# Patient Record
Sex: FEMALE | Race: WHITE | NOT HISPANIC OR LATINO | Employment: OTHER | URBAN - METROPOLITAN AREA
[De-identification: names, ages, dates, MRNs, and addresses within clinical notes are randomized per-mention and may not be internally consistent; named-entity substitution may affect disease eponyms.]

---

## 2023-08-29 ENCOUNTER — OFFICE VISIT (OUTPATIENT)
Dept: FAMILY MEDICINE | Facility: CLINIC | Age: 43
End: 2023-08-29
Payer: COMMERCIAL

## 2023-08-29 VITALS
RESPIRATION RATE: 18 BRPM | WEIGHT: 122.81 LBS | SYSTOLIC BLOOD PRESSURE: 94 MMHG | DIASTOLIC BLOOD PRESSURE: 62 MMHG | BODY MASS INDEX: 24.76 KG/M2 | HEART RATE: 58 BPM | HEIGHT: 59 IN | OXYGEN SATURATION: 96 %

## 2023-08-29 DIAGNOSIS — F41.0 PANIC ATTACK: ICD-10-CM

## 2023-08-29 DIAGNOSIS — G90.A POTS (POSTURAL ORTHOSTATIC TACHYCARDIA SYNDROME): Primary | ICD-10-CM

## 2023-08-29 DIAGNOSIS — F41.1 GAD (GENERALIZED ANXIETY DISORDER): ICD-10-CM

## 2023-08-29 PROCEDURE — 4010F ACE/ARB THERAPY RXD/TAKEN: CPT | Mod: CPTII,S$GLB,, | Performed by: FAMILY MEDICINE

## 2023-08-29 PROCEDURE — 99204 OFFICE O/P NEW MOD 45 MIN: CPT | Mod: S$GLB,,, | Performed by: FAMILY MEDICINE

## 2023-08-29 PROCEDURE — 99203 OFFICE O/P NEW LOW 30 MIN: CPT | Mod: PBBFAC,PO | Performed by: FAMILY MEDICINE

## 2023-08-29 PROCEDURE — 99204 PR OFFICE/OUTPT VISIT, NEW, LEVL IV, 45-59 MIN: ICD-10-PCS | Mod: S$GLB,,, | Performed by: FAMILY MEDICINE

## 2023-08-29 PROCEDURE — 4010F PR ACE/ARB THEARPY RXD/TAKEN: ICD-10-PCS | Mod: CPTII,S$GLB,, | Performed by: FAMILY MEDICINE

## 2023-08-29 PROCEDURE — 99999 PR PBB SHADOW E&M-NEW PATIENT-LVL III: CPT | Mod: PBBFAC,,, | Performed by: FAMILY MEDICINE

## 2023-08-29 PROCEDURE — 99999 PR PBB SHADOW E&M-NEW PATIENT-LVL III: ICD-10-PCS | Mod: PBBFAC,,, | Performed by: FAMILY MEDICINE

## 2023-08-29 RX ORDER — BLOOD SUGAR DIAGNOSTIC
STRIP MISCELLANEOUS
COMMUNITY
Start: 2023-06-26

## 2023-08-29 RX ORDER — PROPRANOLOL HYDROCHLORIDE 10 MG/1
TABLET ORAL
COMMUNITY
End: 2023-08-29

## 2023-08-29 RX ORDER — DEXTROSE 4 G
TABLET,CHEWABLE ORAL
COMMUNITY
Start: 2023-06-07

## 2023-08-29 RX ORDER — ALPRAZOLAM 0.5 MG/1
0.5 TABLET ORAL 3 TIMES DAILY PRN
Qty: 30 TABLET | Refills: 0 | Status: SHIPPED | OUTPATIENT
Start: 2023-08-29 | End: 2023-09-28

## 2023-08-29 RX ORDER — ALPRAZOLAM 0.25 MG/1
0.25 TABLET ORAL
COMMUNITY
Start: 2023-08-18

## 2023-08-29 RX ORDER — FLUOXETINE HYDROCHLORIDE 20 MG/1
20 CAPSULE ORAL DAILY
Qty: 90 CAPSULE | Refills: 3 | Status: SHIPPED | OUTPATIENT
Start: 2023-08-29 | End: 2023-11-01

## 2023-08-29 RX ORDER — METOPROLOL SUCCINATE 25 MG/1
25 TABLET, EXTENDED RELEASE ORAL DAILY
Qty: 90 TABLET | Refills: 3 | Status: SHIPPED | OUTPATIENT
Start: 2023-08-29 | End: 2023-11-01

## 2023-08-29 NOTE — PATIENT INSTRUCTIONS
Ask your pharmacist about the tetanus booster Tdap about the COVID-19 vaccine series completion there 5 total shots.    He call me back in 6 weeks to let me know how your feeling on the Prozac

## 2023-08-29 NOTE — PROGRESS NOTES
"Subjective:       Patient ID: Alison Magana is a 43 y.o. female.    Chief Complaint: Establish Care, Headache (Numbness and tingling to face/), ear buzzing, and [employer]    HPI:  This is a pleasant 40-year-old patient here with her mother Loco.  She started having episodes of lightheadedness and dizziness and presyncope.  Also fluctuations in her weight with rates of around 160 while she was sitting down.  She has been seen by Cardiology and has monitor placed.  She was placed on a beta-blocker.  She is still very concerned about what this might be.  She has also had some GI symptoms which complicate issue confuse the issue.  She more than likely does have pots syndrome.  Do recommend hydration and also appropriate beta-blocker.  Follow through with Cardiology.    Because of the anxiety that this is caused her several months ago do recommend we go to an SSRI in the fluoxetine start with 20 mg a day and increase to 40 mg and 6 weeks needed.  I will follow up with her closely every 3 months for a short time.  Labs reviewed everything looks fine there  Review of Systems   Constitutional: Negative.    HENT: Negative.     Eyes: Negative.    Respiratory: Negative.     Cardiovascular: Negative.    Gastrointestinal: Negative.    Endocrine: Negative.    Genitourinary: Negative.    Musculoskeletal: Negative.    Skin: Negative.    Allergic/Immunologic: Negative.    Neurological: Negative.    Hematological: Negative.    Psychiatric/Behavioral: Negative.         Objective:      Vitals:    08/29/23 0811   BP: 94/62   Pulse: (!) 58   Resp: 18   SpO2: 96%   Weight: 55.7 kg (122 lb 12.7 oz)   Height: 4' 11" (1.499 m)      Physical Exam  Vitals and nursing note reviewed.   Constitutional:       Appearance: Normal appearance. She is normal weight.   HENT:      Head: Normocephalic and atraumatic.      Nose: Nose normal.      Mouth/Throat:      Mouth: Mucous membranes are moist.      Pharynx: Oropharynx is clear.   Eyes:     "  Extraocular Movements: Extraocular movements intact.      Conjunctiva/sclera: Conjunctivae normal.      Pupils: Pupils are equal, round, and reactive to light.   Cardiovascular:      Rate and Rhythm: Normal rate and regular rhythm.      Pulses: Normal pulses.      Heart sounds: Normal heart sounds.   Pulmonary:      Effort: Pulmonary effort is normal.      Breath sounds: Normal breath sounds.   Abdominal:      General: Abdomen is flat. Bowel sounds are normal.      Palpations: Abdomen is soft.   Musculoskeletal:         General: Normal range of motion.      Cervical back: Normal range of motion and neck supple.   Skin:     General: Skin is warm and dry.      Capillary Refill: Capillary refill takes less than 2 seconds.   Neurological:      General: No focal deficit present.      Mental Status: She is alert and oriented to person, place, and time. Mental status is at baseline.   Psychiatric:         Mood and Affect: Mood normal.         Behavior: Behavior normal.         Thought Content: Thought content normal.         Judgment: Judgment normal.         Results for orders placed or performed in visit on 07/12/11   TSH   Result Value Ref Range    TSH 1.39 0.4 - 4.0 uIU/ml   Comprehensive metabolic panel   Result Value Ref Range    Glucose 130 (H) 70 - 110 mg/dl    BUN 16 6 - 20 mg/dl    Creatinine 0.7 0.5 - 1.4 mg/dl    Calcium 8.9 8.7 - 10.5 mg/dl    Sodium 138 136 - 145 mmol/L    Potassium 4.1 3.5 - 5.1 mmol/L    Chloride 107 95 - 110 mmol/L    Total Protein 7.2 6.0 - 8.4 g/dL    Albumin 3.9 3.5 - 5.2 g/dl    Total Bilirubin 0.3 0.1 - 1.0 mg/dl    AST 16 10 - 40 U/L    Alkaline Phosphatase 77 55 - 135 U/L    CO2 21 (L) 23.0 - 29.0 mmol/L    ALT 15 10 - 44 U/L    Anion Gap 15 10 - 20 mmol/L    eGFR if non African American >60 >60 mL/min    eGFR if African American >60 >60 mL/min      Assessment:       1. POTS (postural orthostatic tachycardia syndrome)    2. NOELLE (generalized anxiety disorder)    3. Panic attack         Plan:       POTS (postural orthostatic tachycardia syndrome)  -     metoprolol succinate (TOPROL-XL) 25 MG 24 hr tablet; Take 1 tablet (25 mg total) by mouth once daily.  Dispense: 90 tablet; Refill: 3    NOELLE (generalized anxiety disorder)  -     FLUoxetine 20 MG capsule; Take 1 capsule (20 mg total) by mouth once daily.  Dispense: 90 capsule; Refill: 3    Panic attack  -     ALPRAZolam (XANAX) 0.5 MG tablet; Take 1 tablet (0.5 mg total) by mouth 3 (three) times daily as needed for Anxiety.  Dispense: 30 tablet; Refill: 0          Medication List with Changes/Refills   New Medications    ALPRAZOLAM (XANAX) 0.5 MG TABLET    Take 1 tablet (0.5 mg total) by mouth 3 (three) times daily as needed for Anxiety.    FLUOXETINE 20 MG CAPSULE    Take 1 capsule (20 mg total) by mouth once daily.    METOPROLOL SUCCINATE (TOPROL-XL) 25 MG 24 HR TABLET    Take 1 tablet (25 mg total) by mouth once daily.   Current Medications    ACCU-CHEK TRELL PLUS TEST STRP STRP    SMARTSIG:Via Meter Daily    ALPRAZOLAM (XANAX) 0.25 MG TABLET    Take 0.25 mg by mouth.    BLOOD SUGAR DIAGNOSTIC (BLOOD GLUCOSE TEST) STRP    1 strip by Other route.    BLOOD-GLUCOSE METER MISC    Use as directed   Discontinued Medications    PROPRANOLOL (INDERAL) 10 MG TABLET

## 2023-09-06 ENCOUNTER — PATIENT OUTREACH (OUTPATIENT)
Dept: ADMINISTRATIVE | Facility: HOSPITAL | Age: 43
End: 2023-09-06
Payer: COMMERCIAL

## 2023-09-06 ENCOUNTER — PATIENT MESSAGE (OUTPATIENT)
Dept: ADMINISTRATIVE | Facility: HOSPITAL | Age: 43
End: 2023-09-06
Payer: COMMERCIAL

## 2023-09-06 DIAGNOSIS — Z12.31 OTHER SCREENING MAMMOGRAM: ICD-10-CM

## 2023-09-08 ENCOUNTER — PATIENT MESSAGE (OUTPATIENT)
Dept: NEUROLOGY | Facility: CLINIC | Age: 43
End: 2023-09-08

## 2023-09-08 ENCOUNTER — OFFICE VISIT (OUTPATIENT)
Dept: NEUROLOGY | Facility: CLINIC | Age: 43
End: 2023-09-08
Payer: COMMERCIAL

## 2023-09-08 VITALS
BODY MASS INDEX: 24.6 KG/M2 | SYSTOLIC BLOOD PRESSURE: 138 MMHG | TEMPERATURE: 98 F | WEIGHT: 122 LBS | HEIGHT: 59 IN | DIASTOLIC BLOOD PRESSURE: 94 MMHG | HEART RATE: 69 BPM | RESPIRATION RATE: 17 BRPM

## 2023-09-08 DIAGNOSIS — M79.18 CERVICAL MYOFASCIAL PAIN SYNDROME: ICD-10-CM

## 2023-09-08 DIAGNOSIS — G47.9 SLEEP DISORDER: ICD-10-CM

## 2023-09-08 DIAGNOSIS — G44.85 STABBING HEADACHE: ICD-10-CM

## 2023-09-08 DIAGNOSIS — H53.8 BLURRED VISION: ICD-10-CM

## 2023-09-08 DIAGNOSIS — R42 DIZZINESS AND GIDDINESS: ICD-10-CM

## 2023-09-08 DIAGNOSIS — H93.A9 PULSATILE TINNITUS, UNSPECIFIED EAR: Primary | ICD-10-CM

## 2023-09-08 DIAGNOSIS — G90.A POTS (POSTURAL ORTHOSTATIC TACHYCARDIA SYNDROME): ICD-10-CM

## 2023-09-08 PROBLEM — I10 ESSENTIAL HYPERTENSION: Status: ACTIVE | Noted: 2020-06-04

## 2023-09-08 PROBLEM — E28.2 PCOS (POLYCYSTIC OVARIAN SYNDROME): Status: ACTIVE | Noted: 2019-12-10

## 2023-09-08 PROCEDURE — 3008F PR BODY MASS INDEX (BMI) DOCUMENTED: ICD-10-PCS | Mod: CPTII,S$GLB,, | Performed by: NURSE PRACTITIONER

## 2023-09-08 PROCEDURE — 99205 PR OFFICE/OUTPT VISIT, NEW, LEVL V, 60-74 MIN: ICD-10-PCS | Mod: S$GLB,,, | Performed by: NURSE PRACTITIONER

## 2023-09-08 PROCEDURE — 99205 OFFICE O/P NEW HI 60 MIN: CPT | Mod: S$GLB,,, | Performed by: NURSE PRACTITIONER

## 2023-09-08 PROCEDURE — 3080F DIAST BP >= 90 MM HG: CPT | Mod: CPTII,S$GLB,, | Performed by: NURSE PRACTITIONER

## 2023-09-08 PROCEDURE — 4010F ACE/ARB THERAPY RXD/TAKEN: CPT | Mod: CPTII,S$GLB,, | Performed by: NURSE PRACTITIONER

## 2023-09-08 PROCEDURE — 3075F SYST BP GE 130 - 139MM HG: CPT | Mod: CPTII,S$GLB,, | Performed by: NURSE PRACTITIONER

## 2023-09-08 PROCEDURE — 3008F BODY MASS INDEX DOCD: CPT | Mod: CPTII,S$GLB,, | Performed by: NURSE PRACTITIONER

## 2023-09-08 PROCEDURE — 1159F MED LIST DOCD IN RCRD: CPT | Mod: CPTII,S$GLB,, | Performed by: NURSE PRACTITIONER

## 2023-09-08 PROCEDURE — 1160F RVW MEDS BY RX/DR IN RCRD: CPT | Mod: CPTII,S$GLB,, | Performed by: NURSE PRACTITIONER

## 2023-09-08 PROCEDURE — 3080F PR MOST RECENT DIASTOLIC BLOOD PRESSURE >= 90 MM HG: ICD-10-PCS | Mod: CPTII,S$GLB,, | Performed by: NURSE PRACTITIONER

## 2023-09-08 PROCEDURE — 99999 PR PBB SHADOW E&M-EST. PATIENT-LVL V: CPT | Mod: PBBFAC,,, | Performed by: NURSE PRACTITIONER

## 2023-09-08 PROCEDURE — 1160F PR REVIEW ALL MEDS BY PRESCRIBER/CLIN PHARMACIST DOCUMENTED: ICD-10-PCS | Mod: CPTII,S$GLB,, | Performed by: NURSE PRACTITIONER

## 2023-09-08 PROCEDURE — 1159F PR MEDICATION LIST DOCUMENTED IN MEDICAL RECORD: ICD-10-PCS | Mod: CPTII,S$GLB,, | Performed by: NURSE PRACTITIONER

## 2023-09-08 PROCEDURE — 4010F PR ACE/ARB THEARPY RXD/TAKEN: ICD-10-PCS | Mod: CPTII,S$GLB,, | Performed by: NURSE PRACTITIONER

## 2023-09-08 PROCEDURE — 3075F PR MOST RECENT SYSTOLIC BLOOD PRESS GE 130-139MM HG: ICD-10-PCS | Mod: CPTII,S$GLB,, | Performed by: NURSE PRACTITIONER

## 2023-09-08 PROCEDURE — 99999 PR PBB SHADOW E&M-EST. PATIENT-LVL V: ICD-10-PCS | Mod: PBBFAC,,, | Performed by: NURSE PRACTITIONER

## 2023-09-08 RX ORDER — TIZANIDINE 4 MG/1
TABLET ORAL
Qty: 30 TABLET | Refills: 11 | Status: SHIPPED | OUTPATIENT
Start: 2023-09-08 | End: 2023-11-01

## 2023-09-08 NOTE — PROGRESS NOTES
"Date of service: 9/8/2023  Referring provider: Aaareferral Self    Subjective:      Chief complaint: Headache       Patient ID: Alison Magana is a 43 y.o. female with anxiety, POTS who presents for new patient evaluation of headache     History of Present Illness    ORIGINAL HEADACHE HISTORY - 9/8/23  Age at onset and course over time: about a month ago she had breast explant surgery and headaches started after that.   She denies migraine history. She had a history of tension type headaches. No family history of headaches.   Her PCP suspects POTS but cardiology does not.   She has dizziness upon standing with tachycardia. She has a feeling of "about to pass out". She is getting out of break easier when standing.   Headaches feels like "popping sensation" or "zap". CT non-contrast done at outside facility which was reported as normal.   Also she has a new blood clot in arm, discovered earlier this week. She is not on Eliquis. She had Holter monitor that showed sinus tachycardia and some PVCs.   She has new blurred vision.  Headaches are brief, but will repeat same day. Headache location varies between episodes. Right eye can be red and swollen during episodes independent of headache location.   She has had episodes of facial numbness/tingling associated with headaches.     Of note, she had Covid in December 2022 and reports she had BP and HR issues more than respiratory symptoms. She recalls in April 2023 symptoms of dizziness began.    Location: temples, top of head, back of neck   Quality:  [x] pressure [x] tight [x] throbbing [x] sharp [x] stabbing   Severity: current 2 with range 1-6  Duration: seconds, minutes  Frequency: daily  Headaches awaken at night?:  yes  Worst time of day: upon waking, morning, evening   Associated with: [x] photophobia [x]  phonophobia [x] osmophobia [x] blurred vision  [] double vision [x] loss of appetite [x] nausea [] vomiting [x] dizziness [x] vertigo  [] tinnitus [x] " irritability [x] sinus pressure [] problems with concentration   [x] neck tightness   Alleviated by:  [x] sleep [] darkness [x] massage [] heat [] ice [] medication  Exacerbated by:  [x] fatigue [] light [] noise [] smells [] coughing [] sneezing  [x] bending over [] ovulation [x] menses [x] alcohol [] change in weather [x]  stress  Ipsilateral autonomic: [] nasal congestion [] lacrimation [] ptosis [] injection [] edema [] foreign body sensation [] ear fullness   ICP:  [] transient visual obscurations  [x] tinnitus low pitched, high pitched, pulsatile - rare prior to a month ago. Pulsatile started within the past month   [] positional headache  [x] non-positional   Sleep habits: trouble staying asleep, sleep apnea - has not had sleep study but suspects it based off sleep patterns   Caffeine intake: none  Gyn status (if female): having periods -  recent negative pregnancy test   HIT 6: did not complete     Current acute treatment:  Xanax    Current prevention:  Fluoxetine - has not started   Propranolol     Previously tried/failed acute treatment:  Tylenol    Previously tried/failed preventative treatment:  Lisinopril  Metoprolol    Review of patient's allergies indicates:   Allergen Reactions    Sulfa (sulfonamide antibiotics) Hives, Itching, Palpitations and Shortness Of Breath    Pseudoephedrine      Current Outpatient Medications   Medication Sig Dispense Refill    ACCU-CHEK TRELL PLUS TEST STRP Strp SMARTSIG:Via Meter Daily      ALPRAZolam (XANAX) 0.25 MG tablet Take 0.25 mg by mouth.      ALPRAZolam (XANAX) 0.5 MG tablet Take 1 tablet (0.5 mg total) by mouth 3 (three) times daily as needed for Anxiety. 30 tablet 0    apixaban (ELIQUIS) 5 mg Tab Take 2 tablets (10 mg total) by mouth 2 (two) times daily. for 7 days 28 tablet 0    apixaban (ELIQUIS) 5 mg Tab Take 1 tablet (5 mg total) by mouth 2 (two) times daily. 60 tablet 0    blood sugar diagnostic (BLOOD GLUCOSE TEST) Strp 1 strip by Other route.       blood-glucose meter Misc Use as directed      FLUoxetine 20 MG capsule Take 1 capsule (20 mg total) by mouth once daily. 90 capsule 3    hydrOXYzine pamoate (VISTARIL) 25 MG Cap Take 1 capsule (25 mg total) by mouth every 8 (eight) hours as needed (anxiety). 10 capsule 0    metoprolol succinate (TOPROL-XL) 25 MG 24 hr tablet Take 1 tablet (25 mg total) by mouth once daily. 90 tablet 3    tiZANidine (ZANAFLEX) 4 MG tablet Half or full tablet by mouth at night as needed for muscle spasm 30 tablet 11     No current facility-administered medications for this visit.       Past Medical History  History reviewed. No pertinent past medical history.    Past Surgical History  History reviewed. No pertinent surgical history.    Family History  History reviewed. No pertinent family history.    Social History  Social History     Socioeconomic History    Marital status:    Tobacco Use    Smoking status: Never    Smokeless tobacco: Never        Review of Systems  14-point review of systems as follows:   No check willam indicates NEGATIVE response   Constitutional: [] weight loss, [] change to appetite   Eyes: [x] change in vision, [] double vision   Ears, nose, mouth, throat: [] frequent nose bleeds, [x] ringing in the ears   Respiratory: [] cough, [] wheezing   Cardiovascular: [] chest pain, [x] palpitations   Gastrointestinal: [] jaundice, [x] nausea/vomiting   Genitourinary: [] incontinence, [] burning with urination   Hematologic/lymphatic: [] easy bruising/bleeding, [] night sweats   Neurological: [x] numbness, [] weakness   Endocrine: [x] fatigue, [x] heat/cold intolerance   Allergy/Immunologic: [] fevers, [] chills   Musculoskeletal: [x] muscle pain, [x] joint pain   Psychiatric: [] thoughts of harming self/others, [] depression   Integumentary: [] rashes, [] sores that do not heal     Objective:        Vitals:    09/08/23 0812   BP: (!) 138/94   Pulse: 69   Resp: 17   Temp: 97.6 °F (36.4 °C)     Body mass index is  24.65 kg/m².    9/8/23  Constitutional: appears in no acute distress, well-developed, well-nourished. Anxious      Eyes: normal conjunctiva, PERRLA    Ears, nose, mouth, throat: external appearance of ears and nose normal, hearing intact     Cardiovascular: regular rate and rhythm, no murmurs appreciated    Respiratory: unlabored respirations, breath sounds normal bilaterally    Gastrointestinal: no visible abdominal masses, no guarding, no visible hernia    Musculoskeletal: normal tone in all four extremities. No abnormal movements. No pronator drift. No orbit. Symmetric finger tapping. Normal station. Normal regular gait. Normal tandem gait.      Spine:   CERVICAL SPINE:  ROM: normal   MUSCLE SPASM: bilateral    FACET LOADING: left    SPURLING: no  CRISTINE / RHIANNON tender: no     Psychiatric: normal judgment and insight. Oriented to person, place, and time.     Neurologic:   Cortical functions: recent and remote memory intact, normal attention span and concentration, speech fluent, adequate fund of knowledge   Cranial nerves: visual fields full, PERRLA, EOMI, symmetric facial strength, hearing intact, palate elevates symmetrically, shoulder shrug 5/5, tongue protrudes midline   Reflexes: 2+ in the upper and lower extremities, no Pedro  Sensation: intact to temperature throughout   Coordination: normal finger to nose, heel to shin    Data Review:     I have personally reviewed the referring provider's notes, labs, & imaging made available to me today.      RADIOLOGY STUDIES:  I have personally reviewed the pertinent images performed.       No results found for this or any previous visit.    Lab Results   Component Value Date     09/04/2023    K 3.9 09/04/2023     09/04/2023    CO2 22 09/04/2023    BUN 6 (L) 09/04/2023    CREATININE 0.77 09/04/2023     09/04/2023    AST 46 (H) 09/04/2023    ALT 35 09/04/2023    ALBUMIN 4.9 09/04/2023    PROT 8.5 (H) 09/04/2023    BILITOT 0.6 09/04/2023    CHOL 207 (H)  09/13/2010    HDL 54 09/13/2010    LDLCALC 138.0 (H) 09/13/2010    TRIG 75 09/13/2010       Lab Results   Component Value Date    WBC 9.68 09/04/2023    HGB 14.4 09/04/2023    HCT 43.0 09/04/2023    MCV 88 09/04/2023     09/04/2023       Lab Results   Component Value Date    TSH 1.39 07/12/2011           Assessment & Plan:       Problem List Items Addressed This Visit    None  Visit Diagnoses       Pulsatile tinnitus, unspecified ear    -  Primary    Relevant Orders    MRV Brain Without Contrast    Dizziness and giddiness        Relevant Orders    MRI Brain W WO Contrast    Blurred vision        Relevant Orders    Ambulatory referral/consult to Ophthalmology    Stabbing headache        Relevant Orders    MRI Brain W WO Contrast    MRV Brain Without Contrast    Cervical myofascial pain syndrome        Relevant Medications    tiZANidine (ZANAFLEX) 4 MG tablet    POTS (postural orthostatic tachycardia syndrome)        Relevant Orders    Ambulatory referral/consult to Cardiology    Sleep disorder        Relevant Orders    Ambulatory referral/consult to Sleep Disorders        Headache does not meet migraine criteria or tension type. MRI to rule out secondary cause. MRV to rule to clot or stenosis. She has new onset pulsatile tinnitus s/p Covid infection. I have a high suspicion she has POTS/dysautonomia which could explain her constellation of symptoms. Will refer to Dr. Simmons for second opinion.         Please call our clinic at [phone] or send a message on the [employer] portal if there are any changes to the plan described below, for example,if you are not contacted for the requested tests, referral(s) within one week, if you are unable to receive the medications prescribed, or if you feel you need to change the treatment course for any reason.     TESTING:  -- MRI, MRV  -- sleep study    REFERRALS:  -- sleep specialist  -- Dr. Simmons for dysautonomia/POTS  [place]  Ph: [phone]  Fax: [phone]  [place]  Ph: [phone]  Fax: [phone]  -- ophthalmology    PREVENTION (use daily regardless of headache):  -- start magnesium in ONE of the following preparations -               1. Magnesium oxide 800mg daily (the most common over the counter kind, may causes loose stools)              2. Magnesium citrate 400-500mg daily (harder to find, but more neutral on the bowels)              3. Magnesium glycinate 400mg daily (hardest to find, look online, but most bowel-neutral, best absorbed)     AS-NEEDED TREATMENT (use total no more than 10 days per month unless otherwise stated):  -- START tizanidine at night. This is a muscle relaxer and it will also make you potentially sleepy. Start with half a tablet to see how you respond but can take up to a whole tablet if needed      Follow up in about 6 weeks (around 10/20/2023).    Candace Fajardo NP

## 2023-09-08 NOTE — PATIENT INSTRUCTIONS
Please call our clinic at [phone] or send a message on the [employer] portal if there are any changes to the plan described below, for example,if you are not contacted for the requested tests, referral(s) within one week, if you are unable to receive the medications prescribed, or if you feel you need to change the treatment course for any reason.     TESTING:  -- MRI, MRV  -- sleep study    REFERRALS:  -- sleep specialist  -- Dr. Simmons for dysautonomia/POTS  [facility]  Ph: [phone]  Fax: [phone]  [place]  Ph: [phone]  Fax: [phone]  -- ophthalmology    PREVENTION (use daily regardless of headache):  -- start magnesium in ONE of the following preparations -               1. Magnesium oxide 800mg daily (the most common over the counter kind, may causes loose stools)              2. Magnesium citrate 400-500mg daily (harder to find, but more neutral on the bowels)              3. Magnesium glycinate 400mg daily (hardest to find, look online, but most bowel-neutral, best absorbed)     AS-NEEDED TREATMENT (use total no more than 10 days per month unless otherwise stated):  -- START tizanidine at night. This is a muscle relaxer and it will also make you potentially sleepy. Start with half a tablet to see how you respond but can take up to a whole tablet if needed

## 2023-09-11 ENCOUNTER — PATIENT MESSAGE (OUTPATIENT)
Dept: NEUROLOGY | Facility: CLINIC | Age: 43
End: 2023-09-11
Payer: COMMERCIAL

## 2023-09-11 DIAGNOSIS — H53.8 BLURRED VISION: Primary | ICD-10-CM

## 2023-09-11 DIAGNOSIS — G47.9 SLEEP DISORDER: Primary | ICD-10-CM

## 2023-09-12 ENCOUNTER — TELEPHONE (OUTPATIENT)
Dept: NEUROLOGY | Facility: CLINIC | Age: 43
End: 2023-09-12
Payer: COMMERCIAL

## 2023-09-18 ENCOUNTER — PATIENT MESSAGE (OUTPATIENT)
Dept: NEUROLOGY | Facility: CLINIC | Age: 43
End: 2023-09-18
Payer: COMMERCIAL

## 2023-09-20 ENCOUNTER — TELEPHONE (OUTPATIENT)
Dept: NEUROLOGY | Facility: CLINIC | Age: 43
End: 2023-09-20
Payer: COMMERCIAL

## 2023-09-20 NOTE — TELEPHONE ENCOUNTER
----- Message from Beatris Erickson sent at 9/20/2023  3:45 PM CDT -----  Contact: Self  Patient is calling in regards to the MRI and MRV of the brain scheduled tomorrow at [facility], stated her friend works at [facility] [employer] in the radiology dept and told her if we can get these orders in for [facility] instead of [facility] she can go ahead and run the tests tomorrow for her. She was wanting to see if we can please put the orders again but for [facility] as the location and she will have them performed their instead. If we can please check on this and call pt back to advise at [phone]. Thank You

## 2023-09-21 ENCOUNTER — PATIENT MESSAGE (OUTPATIENT)
Dept: NEUROLOGY | Facility: CLINIC | Age: 43
End: 2023-09-21
Payer: COMMERCIAL

## 2023-09-27 ENCOUNTER — TELEPHONE (OUTPATIENT)
Dept: HEMATOLOGY/ONCOLOGY | Facility: CLINIC | Age: 43
End: 2023-09-27
Payer: COMMERCIAL

## 2023-09-27 NOTE — TELEPHONE ENCOUNTER
[facility].  Needs to be on benign hem schedule.  Called and spoke to pt about her sching incorrectly on the [facility].    She stated her PCP, Dr Graham recommended her to see Hematology for DVT upper extremity.   She said the original hem referral went to Dr Bah but she was trying to find a sooner appt.   I was unable to give her sooner appt, she said she was going to appt with Dr Bah, but said she will call back if changes her mind to [facility] here.

## 2023-09-27 NOTE — TELEPHONE ENCOUNTER
Spoke with the pt and the pt stated that she had the situation taking care so did not need my assistance. Pt verbalized and understanding.

## 2023-10-05 ENCOUNTER — PATIENT MESSAGE (OUTPATIENT)
Dept: RHEUMATOLOGY | Facility: CLINIC | Age: 43
End: 2023-10-05

## 2023-10-05 ENCOUNTER — LAB VISIT (OUTPATIENT)
Dept: LAB | Facility: HOSPITAL | Age: 43
End: 2023-10-05
Attending: STUDENT IN AN ORGANIZED HEALTH CARE EDUCATION/TRAINING PROGRAM
Payer: COMMERCIAL

## 2023-10-05 ENCOUNTER — OFFICE VISIT (OUTPATIENT)
Dept: RHEUMATOLOGY | Facility: CLINIC | Age: 43
End: 2023-10-05
Payer: COMMERCIAL

## 2023-10-05 VITALS
DIASTOLIC BLOOD PRESSURE: 96 MMHG | HEART RATE: 82 BPM | WEIGHT: 121.69 LBS | SYSTOLIC BLOOD PRESSURE: 135 MMHG | HEIGHT: 59 IN | BODY MASS INDEX: 24.53 KG/M2

## 2023-10-05 DIAGNOSIS — M79.10 MYALGIA: ICD-10-CM

## 2023-10-05 DIAGNOSIS — R53.83 FATIGUE, UNSPECIFIED TYPE: ICD-10-CM

## 2023-10-05 DIAGNOSIS — K11.7 XEROSTOMIA: ICD-10-CM

## 2023-10-05 DIAGNOSIS — M79.10 MYALGIA: Primary | ICD-10-CM

## 2023-10-05 LAB
CK SERPL-CCNC: 32 U/L (ref 20–180)
CRP SERPL-MCNC: 1 MG/L (ref 0–8.2)

## 2023-10-05 PROCEDURE — 4010F ACE/ARB THERAPY RXD/TAKEN: CPT | Mod: CPTII,S$GLB,, | Performed by: STUDENT IN AN ORGANIZED HEALTH CARE EDUCATION/TRAINING PROGRAM

## 2023-10-05 PROCEDURE — 85652 RBC SED RATE AUTOMATED: CPT | Performed by: STUDENT IN AN ORGANIZED HEALTH CARE EDUCATION/TRAINING PROGRAM

## 2023-10-05 PROCEDURE — 82550 ASSAY OF CK (CPK): CPT | Performed by: STUDENT IN AN ORGANIZED HEALTH CARE EDUCATION/TRAINING PROGRAM

## 2023-10-05 PROCEDURE — 3080F DIAST BP >= 90 MM HG: CPT | Mod: CPTII,S$GLB,, | Performed by: STUDENT IN AN ORGANIZED HEALTH CARE EDUCATION/TRAINING PROGRAM

## 2023-10-05 PROCEDURE — 3075F SYST BP GE 130 - 139MM HG: CPT | Mod: CPTII,S$GLB,, | Performed by: STUDENT IN AN ORGANIZED HEALTH CARE EDUCATION/TRAINING PROGRAM

## 2023-10-05 PROCEDURE — 1159F PR MEDICATION LIST DOCUMENTED IN MEDICAL RECORD: ICD-10-PCS | Mod: CPTII,S$GLB,, | Performed by: STUDENT IN AN ORGANIZED HEALTH CARE EDUCATION/TRAINING PROGRAM

## 2023-10-05 PROCEDURE — 3008F BODY MASS INDEX DOCD: CPT | Mod: CPTII,S$GLB,, | Performed by: STUDENT IN AN ORGANIZED HEALTH CARE EDUCATION/TRAINING PROGRAM

## 2023-10-05 PROCEDURE — 86235 NUCLEAR ANTIGEN ANTIBODY: CPT | Mod: 59 | Performed by: STUDENT IN AN ORGANIZED HEALTH CARE EDUCATION/TRAINING PROGRAM

## 2023-10-05 PROCEDURE — 3075F PR MOST RECENT SYSTOLIC BLOOD PRESS GE 130-139MM HG: ICD-10-PCS | Mod: CPTII,S$GLB,, | Performed by: STUDENT IN AN ORGANIZED HEALTH CARE EDUCATION/TRAINING PROGRAM

## 2023-10-05 PROCEDURE — 86038 ANTINUCLEAR ANTIBODIES: CPT | Performed by: STUDENT IN AN ORGANIZED HEALTH CARE EDUCATION/TRAINING PROGRAM

## 2023-10-05 PROCEDURE — 86235 NUCLEAR ANTIGEN ANTIBODY: CPT | Performed by: STUDENT IN AN ORGANIZED HEALTH CARE EDUCATION/TRAINING PROGRAM

## 2023-10-05 PROCEDURE — 86140 C-REACTIVE PROTEIN: CPT | Performed by: STUDENT IN AN ORGANIZED HEALTH CARE EDUCATION/TRAINING PROGRAM

## 2023-10-05 PROCEDURE — 3008F PR BODY MASS INDEX (BMI) DOCUMENTED: ICD-10-PCS | Mod: CPTII,S$GLB,, | Performed by: STUDENT IN AN ORGANIZED HEALTH CARE EDUCATION/TRAINING PROGRAM

## 2023-10-05 PROCEDURE — 99205 OFFICE O/P NEW HI 60 MIN: CPT | Mod: S$GLB,,, | Performed by: STUDENT IN AN ORGANIZED HEALTH CARE EDUCATION/TRAINING PROGRAM

## 2023-10-05 PROCEDURE — 1160F PR REVIEW ALL MEDS BY PRESCRIBER/CLIN PHARMACIST DOCUMENTED: ICD-10-PCS | Mod: CPTII,S$GLB,, | Performed by: STUDENT IN AN ORGANIZED HEALTH CARE EDUCATION/TRAINING PROGRAM

## 2023-10-05 PROCEDURE — 99999 PR PBB SHADOW E&M-EST. PATIENT-LVL IV: CPT | Mod: PBBFAC,,, | Performed by: STUDENT IN AN ORGANIZED HEALTH CARE EDUCATION/TRAINING PROGRAM

## 2023-10-05 PROCEDURE — 82085 ASSAY OF ALDOLASE: CPT | Performed by: STUDENT IN AN ORGANIZED HEALTH CARE EDUCATION/TRAINING PROGRAM

## 2023-10-05 PROCEDURE — 1159F MED LIST DOCD IN RCRD: CPT | Mod: CPTII,S$GLB,, | Performed by: STUDENT IN AN ORGANIZED HEALTH CARE EDUCATION/TRAINING PROGRAM

## 2023-10-05 PROCEDURE — 3080F PR MOST RECENT DIASTOLIC BLOOD PRESSURE >= 90 MM HG: ICD-10-PCS | Mod: CPTII,S$GLB,, | Performed by: STUDENT IN AN ORGANIZED HEALTH CARE EDUCATION/TRAINING PROGRAM

## 2023-10-05 PROCEDURE — 99999 PR PBB SHADOW E&M-EST. PATIENT-LVL IV: ICD-10-PCS | Mod: PBBFAC,,, | Performed by: STUDENT IN AN ORGANIZED HEALTH CARE EDUCATION/TRAINING PROGRAM

## 2023-10-05 PROCEDURE — 4010F PR ACE/ARB THEARPY RXD/TAKEN: ICD-10-PCS | Mod: CPTII,S$GLB,, | Performed by: STUDENT IN AN ORGANIZED HEALTH CARE EDUCATION/TRAINING PROGRAM

## 2023-10-05 PROCEDURE — 36415 COLL VENOUS BLD VENIPUNCTURE: CPT | Performed by: STUDENT IN AN ORGANIZED HEALTH CARE EDUCATION/TRAINING PROGRAM

## 2023-10-05 PROCEDURE — 1160F RVW MEDS BY RX/DR IN RCRD: CPT | Mod: CPTII,S$GLB,, | Performed by: STUDENT IN AN ORGANIZED HEALTH CARE EDUCATION/TRAINING PROGRAM

## 2023-10-05 PROCEDURE — 99205 PR OFFICE/OUTPT VISIT, NEW, LEVL V, 60-74 MIN: ICD-10-PCS | Mod: S$GLB,,, | Performed by: STUDENT IN AN ORGANIZED HEALTH CARE EDUCATION/TRAINING PROGRAM

## 2023-10-05 RX ORDER — PROPRANOLOL HYDROCHLORIDE 10 MG/1
5 TABLET ORAL 2 TIMES DAILY
COMMUNITY

## 2023-10-05 NOTE — PROGRESS NOTES
RHEUMATOLOGY CLINIC INITIAL VISIT    Reason for consult:- joint pains    Chief complaints, HPI, ROS, EXAM, Assessment & Plans:-    Alison Magana is a 43 y.o. pleasant female who presents to be evaluated for widespread arthralgias.  Also has myalgias.  She also complains of very severe fatigue at times.  She goes through flares where her symptoms will be really bad and then intermittently will feel relatively okay.  This has been going on for at least 5 years.  More recently she is had episodes of lightheadedness and change in heart rate.  She is been concerned that she may have POTS.  She did see a cardiologist who did echo and stress test that was unrevealing.  Of note, she recently was found to have blood clot in her arm.  She will be seeing Hematology today for further workup of this.  She is on anticoagulation for this.  She also has a history of irritable bowel.  States she eliminated gluten but has not been too helpful.  Of note she did have COVID in December.  Previously tried taking vitamins and supplements but did not find this very helpful.  She also complains of dry mouth and dry eyes.  She had previous testing in the past that was largely unrevealing for autoimmune conditions.  She did have an early Sjogren's panel which had 1 marker positive but all others were negative.  She has a number of cousins with various autoimmune condition such as lupus and rheumatoid arthritis but no immediate family members with autoimmune disease.  States she has a lot of brain fog.  Does have some depression.  She does have a lot of anxiety as well.  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  Rheumatologic review of systems otherwise negative. No evidence of synovitis, dactylitis or enthesitis.  No rashes or patchy alopecia noted.      Reviewed all available old and outside pertinent medical records.    All lab results personally reviewed and interpreted by me.    1. Myalgia    2. Xerostomia    3. Fatigue,  unspecified type        Problem List Items Addressed This Visit    None  Visit Diagnoses       Myalgia    -  Primary    Relevant Orders    CK    Aldolase    ALTON Screen w/Reflex    C-Reactive Protein    Sedimentation rate    Sjogrens syndrome-A extractable nuclear antibody    ANTI-SSB ANTIBODY    Xerostomia        Relevant Orders    CK    Aldolase    ALTON Screen w/Reflex    C-Reactive Protein    Sedimentation rate    Sjogrens syndrome-A extractable nuclear antibody    ANTI-SSB ANTIBODY    Fatigue, unspecified type        Relevant Orders    CK    Aldolase    ALTON Screen w/Reflex    C-Reactive Protein    Sedimentation rate    Sjogrens syndrome-A extractable nuclear antibody    ANTI-SSB ANTIBODY            Patient presenting to be evaluated for widespread joint pain/myalgia  She also has severe chronic fatigue as well  Concern for possible Sjogren's syndrome with dry eyes/dry mouth symptoms  Workup thus far has been fairly unremarkable with negative ALTON multiple times and normal inflammatory markers  We will recheck ALTON as well as CK/aldolase, ESR/CRP, SSA, SSB  History, exam and labwork thus far do not raise suspicion for underlying rheumatologic disease  Widespread pain index and symptom severity score consistent with chronic pain syndrome as in fibromyalgia  Discussed importance of low intensity aerobic exercise (as in Yoga, Filiberto-Chi, walking or aquatherapy), restorative sleep, healthy diet and stress management  Provided handout with strategies to improve these lifestyle factors  Also provided info on chronic fatigue syndrome/myalgic encephalomyelitis  Would consider low-dose naltrexone if no improvement with lifestyle modifications        # Follow up if symptoms worsen or fail to improve.    Chronic comorbid conditions affecting medical decision making today:    Past Medical History:   Diagnosis Date    Clotting disorder     History of gestational diabetes     Hypertension     PCOS (polycystic ovarian syndrome)      Prediabetes        Past Surgical History:   Procedure Laterality Date    ADENOIDECTOMY      breast implants Bilateral 08/11/2023    removal    kidney stone removal      TONSILLECTOMY          Social History     Tobacco Use    Smoking status: Never    Smokeless tobacco: Never   Substance Use Topics    Drug use: Never       History reviewed. No pertinent family history.    Review of patient's allergies indicates:   Allergen Reactions    Sulfa (sulfonamide antibiotics) Hives, Itching, Palpitations and Shortness Of Breath    Pseudoephedrine        Medication List with Changes/Refills   Current Medications    ACCU-CHEK TRELL PLUS TEST STRP STRP    SMARTSIG:Via Meter Daily    ALPRAZOLAM (XANAX) 0.25 MG TABLET    Take 0.25 mg by mouth.    ALPRAZOLAM (XANAX) 0.5 MG TABLET    Take 1 tablet (0.5 mg total) by mouth 3 (three) times daily as needed for Anxiety.    BLOOD SUGAR DIAGNOSTIC (BLOOD GLUCOSE TEST) STRP    1 strip by Other route.    BLOOD-GLUCOSE METER MISC    Use as directed    FLUOXETINE 20 MG CAPSULE    Take 1 capsule (20 mg total) by mouth once daily.    HYDROXYZINE PAMOATE (VISTARIL) 25 MG CAP    Take 1 capsule (25 mg total) by mouth every 8 (eight) hours as needed (anxiety).    METOPROLOL SUCCINATE (TOPROL-XL) 25 MG 24 HR TABLET    Take 1 tablet (25 mg total) by mouth once daily.    PROPRANOLOL (INDERAL) 10 MG TABLET    Take 5 mg by mouth once daily. Patient taking 5mg once a day    TIZANIDINE (ZANAFLEX) 4 MG TABLET    Half or full tablet by mouth at night as needed for muscle spasm         Disclaimer: This note was prepared using voice recognition system and is likely to have sound alike errors and is not proofread.  Please message me with any questions.    63 minutes of total time spent on the encounter, which includes face to face time and non-face to face time preparing to see the patient (eg, review of tests), Obtaining and/or reviewing separately obtained history, Documenting clinical information in the  electronic or other health record, Independently interpreting results (not separately reported) and communicating results to the patient/family/caregiver, or Care coordination (not separately reported).     Thank you for allowing me to participate in the care of Alison Magana.    Christian Haywood MD        Widespread pain index  Note the areas which the patient has had pain over the last week:                   Shoulder-girdle, left 1               Shoulder-girdle, right 1                         Upper arm left 1                       Upper arm right 1                         Lower arm left 1                       Lower arm right 1    Hip (buttock, trochanter) left 1  Hip (buttock, trochanter) right 1                           Upper leg, left 1                         Upper leg, right 1                           Lower leg, left 1                         Lower leg, right 1                                     Jaw, left 1                                   Jaw, right 1                                        Chest 1                                  Abdomen 1                               Upper back 0                              Lower back 1                                        Neck 1  Score will be from 0-19: 18                                         Symptom severity score  Fatigue 3  Waking Unrefreshed 3  Cognitive Symptoms 2   0 = no problem, 1=slight or mild problem 2= moderate; considerable problems often present and/or at a moderate level, 3 = severe, pervasive, continuous, life disturbing problem  For each of the 3 symptoms, indicate the level of severity over the past week using the Scale.  The symptom severity score is the sum of the severity of the 3 symptoms (fatigue, waking unrefreshed, and cognitive symptoms) plus the number of the following symptoms occurring during the previous 6 months:   Headaches 1  Pain or cramps in the lower abdomen 1  Depression 1  The final score is between 0 and 12: 11                                           Criteria  Patient has fibromyalgia if the following 3 conditions are met:  1.  Widespread pain index greater than or equal to 7 and symptom severity score greater than or equal to 5 or widespread pain index between 3- 6, and symptom severity score greater than or equal to 9.    2.  Symptoms have been present in a similar level for at least 3 months  3.  The patient does not have a disorder that would otherwise sufficiently explain the pain

## 2023-10-06 LAB
ANA SER QL IF: NORMAL
ERYTHROCYTE [SEDIMENTATION RATE] IN BLOOD BY PHOTOMETRIC METHOD: 13 MM/HR (ref 0–36)

## 2023-10-09 LAB
ALDOLASE SERPL-CCNC: 3.9 U/L (ref 1.2–7.6)
ANTI-SSA ANTIBODY: 0.07 RATIO (ref 0–0.99)
ANTI-SSA INTERPRETATION: NEGATIVE
ANTI-SSB ANTIBODY: 0.06 RATIO (ref 0–0.99)
ANTI-SSB INTERPRETATION: NEGATIVE

## 2023-11-01 ENCOUNTER — OFFICE VISIT (OUTPATIENT)
Dept: NEUROLOGY | Facility: CLINIC | Age: 43
End: 2023-11-01
Payer: COMMERCIAL

## 2023-11-01 VITALS
SYSTOLIC BLOOD PRESSURE: 140 MMHG | HEART RATE: 80 BPM | WEIGHT: 121.81 LBS | TEMPERATURE: 98 F | BODY MASS INDEX: 24.56 KG/M2 | HEIGHT: 59 IN | DIASTOLIC BLOOD PRESSURE: 80 MMHG | RESPIRATION RATE: 17 BRPM

## 2023-11-01 DIAGNOSIS — R42 DIZZINESS AND GIDDINESS: ICD-10-CM

## 2023-11-01 DIAGNOSIS — G44.211 INTRACTABLE EPISODIC TENSION-TYPE HEADACHE: Primary | ICD-10-CM

## 2023-11-01 DIAGNOSIS — G47.9 SLEEP DISORDER: ICD-10-CM

## 2023-11-01 PROCEDURE — 3079F DIAST BP 80-89 MM HG: CPT | Mod: CPTII,S$GLB,, | Performed by: NURSE PRACTITIONER

## 2023-11-01 PROCEDURE — 1160F RVW MEDS BY RX/DR IN RCRD: CPT | Mod: CPTII,S$GLB,, | Performed by: NURSE PRACTITIONER

## 2023-11-01 PROCEDURE — 99999 PR PBB SHADOW E&M-EST. PATIENT-LVL IV: ICD-10-PCS | Mod: PBBFAC,,, | Performed by: NURSE PRACTITIONER

## 2023-11-01 PROCEDURE — 3008F PR BODY MASS INDEX (BMI) DOCUMENTED: ICD-10-PCS | Mod: CPTII,S$GLB,, | Performed by: NURSE PRACTITIONER

## 2023-11-01 PROCEDURE — 3008F BODY MASS INDEX DOCD: CPT | Mod: CPTII,S$GLB,, | Performed by: NURSE PRACTITIONER

## 2023-11-01 PROCEDURE — 3079F PR MOST RECENT DIASTOLIC BLOOD PRESSURE 80-89 MM HG: ICD-10-PCS | Mod: CPTII,S$GLB,, | Performed by: NURSE PRACTITIONER

## 2023-11-01 PROCEDURE — 1159F PR MEDICATION LIST DOCUMENTED IN MEDICAL RECORD: ICD-10-PCS | Mod: CPTII,S$GLB,, | Performed by: NURSE PRACTITIONER

## 2023-11-01 PROCEDURE — 1160F PR REVIEW ALL MEDS BY PRESCRIBER/CLIN PHARMACIST DOCUMENTED: ICD-10-PCS | Mod: CPTII,S$GLB,, | Performed by: NURSE PRACTITIONER

## 2023-11-01 PROCEDURE — 99999 PR PBB SHADOW E&M-EST. PATIENT-LVL IV: CPT | Mod: PBBFAC,,, | Performed by: NURSE PRACTITIONER

## 2023-11-01 PROCEDURE — 3077F PR MOST RECENT SYSTOLIC BLOOD PRESSURE >= 140 MM HG: ICD-10-PCS | Mod: CPTII,S$GLB,, | Performed by: NURSE PRACTITIONER

## 2023-11-01 PROCEDURE — 4010F ACE/ARB THERAPY RXD/TAKEN: CPT | Mod: CPTII,S$GLB,, | Performed by: NURSE PRACTITIONER

## 2023-11-01 PROCEDURE — 1159F MED LIST DOCD IN RCRD: CPT | Mod: CPTII,S$GLB,, | Performed by: NURSE PRACTITIONER

## 2023-11-01 PROCEDURE — 4010F PR ACE/ARB THEARPY RXD/TAKEN: ICD-10-PCS | Mod: CPTII,S$GLB,, | Performed by: NURSE PRACTITIONER

## 2023-11-01 PROCEDURE — 3077F SYST BP >= 140 MM HG: CPT | Mod: CPTII,S$GLB,, | Performed by: NURSE PRACTITIONER

## 2023-11-01 PROCEDURE — 99214 PR OFFICE/OUTPT VISIT, EST, LEVL IV, 30-39 MIN: ICD-10-PCS | Mod: S$GLB,,, | Performed by: NURSE PRACTITIONER

## 2023-11-01 PROCEDURE — 99214 OFFICE O/P EST MOD 30 MIN: CPT | Mod: S$GLB,,, | Performed by: NURSE PRACTITIONER

## 2023-11-01 NOTE — PATIENT INSTRUCTIONS
"Please call our clinic at [phone] or send a message on the [employer] portal if there are any changes to the plan described below, for example,if you are not contacted for the requested tests, referral(s) within one week, if you are unable to receive the medications prescribed, or if you feel you need to change the treatment course for any reason.     TESTING:  -- none     REFERRALS:  -- none     PREVENTION (use daily regardless of headache):  -- continue magnesium in ONE of the following preparations -               1. Magnesium oxide 800mg daily (the most common over the counter kind, may causes loose stools)              2. Magnesium citrate 400-500mg daily (harder to find, but more neutral on the bowels)              3. Magnesium glycinate 400mg daily (hardest to find, look online, but most bowel-neutral, best absorbed)   -- if needed, we could try amitriptyline or Cymbalta to reduce tension type headache frequency. Both are also good for fibromyalgia pain    AS-NEEDED TREATMENT (use total no more than 10 days per month unless otherwise stated):  -- ok to take tylenol       Suggested that the patient research out OTC supplements (stressed NOT FDA regulated and should take at own risk).    To help prevent a headache:   Petadolex (butterbur root)  Vitamin B2 (riboflavin)  CoQ10  Magnesium  "Migraeeze" which is petadolex/Vitamin B2/ginger  "Dolovent" which is magnesium/Vitamin B2/coq10    * all of the above can be found locally in a variety of shops or on the internet     To help stop a headache while it occurs:   Ausanil (nasal spray that has some components of pepper plant extract and ginger plant essence). Information from the creator's website (http://ausanil.[employer]ed[employer]/faq/).  Per reports, can cause mild discomfort upon application, which ultimately helps stops the headache.      *It can be found online for purchase from multiple websites     "

## 2023-11-01 NOTE — PROGRESS NOTES
"Date of service: 11/1/2023  Referring provider: No ref. provider found    Subjective:      Chief complaint: Headache       Patient ID: Alison Magana is a 43 y.o. female with anxiety, POTS who presents for follow up of headache     History of Present Illness    INTERVAL HISTORY 11/1/23    Last visit was about six weeks ago and at that time I ordered imaging and recommended referral for dysautonomia. Referred to sleep study and ophthalmology. MRI and MRV normal.    Today she reports she is better. Seeing spots and more floaters, tension in temples. Headache feels like tightness/squeezing in temple area and is brief. Current pain 1 with range 0-8. She has 3-4 headache days per week. She takes tylenol 4-5 days per week. She saw outside ophthalmology who noted dry eyes but otherwise normal. She is scheduled with sleep specialist next week. Otherwise information below is reviewed and verified with no changes made     ORIGINAL HEADACHE HISTORY - 9/8/23  Age at onset and course over time: about a month ago she had breast explant surgery and headaches started after that.   She denies migraine history. She had a history of tension type headaches. No family history of headaches.   Her PCP suspects POTS but cardiology does not.   She has dizziness upon standing with tachycardia. She has a feeling of "about to pass out". She is getting out of break easier when standing.   Headaches feels like "popping sensation" or "zap". CT non-contrast done at outside facility which was reported as normal.   Also she has a new blood clot in arm, discovered earlier this week. She is not on Eliquis. She had Holter monitor that showed sinus tachycardia and some PVCs.   She has new blurred vision.  Headaches are brief, but will repeat same day. Headache location varies between episodes. Right eye can be red and swollen during episodes independent of headache location.   She has had episodes of facial numbness/tingling associated with " headaches.     Of note, she had Covid in December 2022 and reports she had BP and HR issues more than respiratory symptoms. She recalls in April 2023 symptoms of dizziness began.    Location: temples, top of head, back of neck   Quality:  [x] pressure [x] tight [x] throbbing [x] sharp [x] stabbing   Severity: current 2 with range 1-6  Duration: seconds, minutes  Frequency: daily  Headaches awaken at night?:  yes  Worst time of day: upon waking, morning, evening   Associated with: [x] photophobia [x]  phonophobia [x] osmophobia [x] blurred vision  [] double vision [x] loss of appetite [x] nausea [] vomiting [x] dizziness [x] vertigo  [] tinnitus [x] irritability [x] sinus pressure [] problems with concentration   [x] neck tightness   Alleviated by:  [x] sleep [] darkness [x] massage [] heat [] ice [] medication  Exacerbated by:  [x] fatigue [] light [] noise [] smells [] coughing [] sneezing  [x] bending over [] ovulation [x] menses [x] alcohol [] change in weather [x]  stress  Ipsilateral autonomic: [] nasal congestion [] lacrimation [] ptosis [] injection [] edema [] foreign body sensation [] ear fullness   ICP:  [] transient visual obscurations  [x] tinnitus low pitched, high pitched, pulsatile - rare prior to a month ago. Pulsatile started within the past month   [] positional headache  [x] non-positional   Sleep habits: trouble staying asleep, sleep apnea - has not had sleep study but suspects it based off sleep patterns   Caffeine intake: none  Gyn status (if female): having periods -  recent negative pregnancy test   HIT 6: did not complete     Current acute treatment:  Xanax    Current prevention:   Propranolol   (Eliquis)    Previously tried/failed acute treatment:  Tylenol  Tizanidine    Previously tried/failed preventative treatment:  Lisinopril  Metoprolol  Fluoxetine    Review of patient's allergies indicates:   Allergen Reactions    Sulfa (sulfonamide antibiotics) Hives, Itching, Palpitations and  Shortness Of Breath    Pseudoephedrine      Current Outpatient Medications   Medication Sig Dispense Refill    ACCU-CHEK TRELL PLUS TEST STRP Strp SMARTSIG:Via Meter Daily      ALPRAZolam (XANAX) 0.25 MG tablet Take 0.25 mg by mouth.      blood-glucose meter Misc Use as directed      propranoloL (INDERAL) 10 MG tablet Take 5 mg by mouth once daily. Patient taking 5mg once a day      apixaban (ELIQUIS) 5 mg Tab Take 5 mg by mouth 2 (two) times daily.       No current facility-administered medications for this visit.       Past Medical History  Past Medical History:   Diagnosis Date    Clotting disorder     History of gestational diabetes     Hypertension     PCOS (polycystic ovarian syndrome)     Prediabetes        Past Surgical History  Past Surgical History:   Procedure Laterality Date    ADENOIDECTOMY      breast implants Bilateral 08/11/2023    removal    kidney stone removal      TONSILLECTOMY         Family History  History reviewed. No pertinent family history.    Social History  Social History     Socioeconomic History    Marital status:    Tobacco Use    Smoking status: Never    Smokeless tobacco: Never   Substance and Sexual Activity    Drug use: Never    Sexual activity: Yes     Partners: Male      Objective:        Vitals:    11/01/23 0900   BP: (!) 140/80   Pulse: 80   Resp: 17   Temp: 97.8 °F (36.6 °C)       Body mass index is 24.6 kg/m².    11/1/23  Constitutional:   She appears well-developed and well-nourished. She is well groomed     Neurological Exam:  General: well-developed, well-nourished, no distress  Mental status: Awake and alert  Speech language: No dysarthria or aphasia on conversation  Cranial nerves: Face symmetric  Motor: Moves all extremities well  Coordination: No ataxia. No tremor.    9/8/23  Constitutional: appears in no acute distress, well-developed, well-nourished. Anxious      Eyes: normal conjunctiva, PERRLA    Ears, nose, mouth, throat: external appearance of ears and  nose normal, hearing intact     Cardiovascular: regular rate and rhythm, no murmurs appreciated    Respiratory: unlabored respirations, breath sounds normal bilaterally    Gastrointestinal: no visible abdominal masses, no guarding, no visible hernia    Musculoskeletal: normal tone in all four extremities. No abnormal movements. No pronator drift. No orbit. Symmetric finger tapping. Normal station. Normal regular gait. Normal tandem gait.      Spine:   CERVICAL SPINE:  ROM: normal   MUSCLE SPASM: bilateral    FACET LOADING: left    SPURLING: no  CRISTINE / RHIANNON tender: no     Psychiatric: normal judgment and insight. Oriented to person, place, and time.     Neurologic:   Cortical functions: recent and remote memory intact, normal attention span and concentration, speech fluent, adequate fund of knowledge   Cranial nerves: visual fields full, PERRLA, EOMI, symmetric facial strength, hearing intact, palate elevates symmetrically, shoulder shrug 5/5, tongue protrudes midline   Reflexes: 2+ in the upper and lower extremities, no Pedro  Sensation: intact to temperature throughout   Coordination: normal finger to nose, heel to shin    Data Review:     I have personally reviewed the referring provider's notes, labs, & imaging made available to me today.      RADIOLOGY STUDIES:  I have personally reviewed the pertinent images performed.       No results found for this or any previous visit.    Lab Results   Component Value Date     10/05/2023    K 3.8 10/05/2023     10/05/2023    CO2 25 10/05/2023    BUN 7 10/05/2023    CREATININE 0.69 10/05/2023     10/05/2023    AST 34 10/05/2023    ALT 27 10/05/2023    ALBUMIN 4.6 10/05/2023    PROT 7.5 10/05/2023    BILITOT 0.3 10/05/2023    CHOL 207 (H) 09/13/2010    HDL 54 09/13/2010    LDLCALC 138.0 (H) 09/13/2010    TRIG 75 09/13/2010       Lab Results   Component Value Date    WBC 7.52 10/05/2023    HGB 12.8 10/05/2023    HCT 39.3 10/05/2023    MCV 89 10/05/2023      10/05/2023       Lab Results   Component Value Date    TSH 1.39 07/12/2011           Assessment & Plan:       Problem List Items Addressed This Visit    None  Visit Diagnoses       Intractable episodic tension-type headache    -  Primary    Imaging normal. Discussed treatment options. She is pending dysautonomia evaluation. She will decide treatment after that visit.    Sleep disorder        Evaluation next week with sleep specialist     Dizziness and giddiness        Improving. Seems to be tied to heart rate issues           Headache does not meet migraine criteria or tension type. MRI to rule out secondary cause. MRV to rule to clot or stenosis. She has new onset pulsatile tinnitus s/p Covid infection. I have a high suspicion she has POTS/dysautonomia which could explain her constellation of symptoms. Will refer to Dr. Simmons for second opinion.         Please call our clinic at [phone] or send a message on the [employer] portal if there are any changes to the plan described below, for example,if you are not contacted for the requested tests, referral(s) within one week, if you are unable to receive the medications prescribed, or if you feel you need to change the treatment course for any reason.     TESTING:  -- none     REFERRALS:  -- none     PREVENTION (use daily regardless of headache):  -- continue magnesium in ONE of the following preparations -               1. Magnesium oxide 800mg daily (the most common over the counter kind, may causes loose stools)              2. Magnesium citrate 400-500mg daily (harder to find, but more neutral on the bowels)              3. Magnesium glycinate 400mg daily (hardest to find, look online, but most bowel-neutral, best absorbed)     AS-NEEDED TREATMENT (use total no more than 10 days per month unless otherwise stated):  -- ok to take tylenol       Follow up in about 3 months (around 2/1/2024).    Candace Fajardo NP

## 2023-11-28 ENCOUNTER — LAB VISIT (OUTPATIENT)
Dept: LAB | Facility: HOSPITAL | Age: 43
End: 2023-11-28
Attending: PSYCHIATRY & NEUROLOGY
Payer: COMMERCIAL

## 2023-11-28 ENCOUNTER — OFFICE VISIT (OUTPATIENT)
Dept: NEUROLOGY | Facility: CLINIC | Age: 43
End: 2023-11-28
Payer: COMMERCIAL

## 2023-11-28 VITALS
HEART RATE: 85 BPM | SYSTOLIC BLOOD PRESSURE: 141 MMHG | DIASTOLIC BLOOD PRESSURE: 89 MMHG | BODY MASS INDEX: 24.31 KG/M2 | HEIGHT: 59 IN | WEIGHT: 120.56 LBS

## 2023-11-28 DIAGNOSIS — G43.809 MIGRAINE VARIANT WITH HEADACHE: ICD-10-CM

## 2023-11-28 DIAGNOSIS — G90.1 DYSAUTONOMIA: ICD-10-CM

## 2023-11-28 DIAGNOSIS — G44.211 INTRACTABLE EPISODIC TENSION-TYPE HEADACHE: ICD-10-CM

## 2023-11-28 DIAGNOSIS — N94.3 PRE-MENSTRUAL SYNDROME: ICD-10-CM

## 2023-11-28 DIAGNOSIS — F41.9 ANXIETY: ICD-10-CM

## 2023-11-28 DIAGNOSIS — G90.1 DYSAUTONOMIA: Primary | ICD-10-CM

## 2023-11-28 DIAGNOSIS — R42 DIZZINESS AND GIDDINESS: ICD-10-CM

## 2023-11-28 LAB
AMYLASE SERPL-CCNC: 70 U/L (ref 20–110)
IGA SERPL-MCNC: 159 MG/DL (ref 40–350)
IGG SERPL-MCNC: 950 MG/DL (ref 650–1600)
IGM SERPL-MCNC: 80 MG/DL (ref 50–300)
LIPASE SERPL-CCNC: 36 U/L (ref 4–60)

## 2023-11-28 PROCEDURE — 4010F ACE/ARB THERAPY RXD/TAKEN: CPT | Mod: CPTII,S$GLB,, | Performed by: PSYCHIATRY & NEUROLOGY

## 2023-11-28 PROCEDURE — 84207 ASSAY OF VITAMIN B-6: CPT | Performed by: PSYCHIATRY & NEUROLOGY

## 2023-11-28 PROCEDURE — 82784 ASSAY IGA/IGD/IGG/IGM EACH: CPT | Mod: 59 | Performed by: PSYCHIATRY & NEUROLOGY

## 2023-11-28 PROCEDURE — 82150 ASSAY OF AMYLASE: CPT | Performed by: PSYCHIATRY & NEUROLOGY

## 2023-11-28 PROCEDURE — 3079F DIAST BP 80-89 MM HG: CPT | Mod: CPTII,S$GLB,, | Performed by: PSYCHIATRY & NEUROLOGY

## 2023-11-28 PROCEDURE — 99417 PROLNG OP E/M EACH 15 MIN: CPT | Mod: S$GLB,,, | Performed by: PSYCHIATRY & NEUROLOGY

## 2023-11-28 PROCEDURE — 83690 ASSAY OF LIPASE: CPT | Performed by: PSYCHIATRY & NEUROLOGY

## 2023-11-28 PROCEDURE — 99417 PR PROLONGED SVC, OUTPT, W/WO DIRECT PT CONTACT,  EA ADDTL 15 MIN: ICD-10-PCS | Mod: S$GLB,,, | Performed by: PSYCHIATRY & NEUROLOGY

## 2023-11-28 PROCEDURE — 1160F RVW MEDS BY RX/DR IN RCRD: CPT | Mod: CPTII,S$GLB,, | Performed by: PSYCHIATRY & NEUROLOGY

## 2023-11-28 PROCEDURE — 4010F PR ACE/ARB THEARPY RXD/TAKEN: ICD-10-PCS | Mod: CPTII,S$GLB,, | Performed by: PSYCHIATRY & NEUROLOGY

## 2023-11-28 PROCEDURE — 3008F BODY MASS INDEX DOCD: CPT | Mod: CPTII,S$GLB,, | Performed by: PSYCHIATRY & NEUROLOGY

## 2023-11-28 PROCEDURE — 1159F MED LIST DOCD IN RCRD: CPT | Mod: CPTII,S$GLB,, | Performed by: PSYCHIATRY & NEUROLOGY

## 2023-11-28 PROCEDURE — 1159F PR MEDICATION LIST DOCUMENTED IN MEDICAL RECORD: ICD-10-PCS | Mod: CPTII,S$GLB,, | Performed by: PSYCHIATRY & NEUROLOGY

## 2023-11-28 PROCEDURE — 3079F PR MOST RECENT DIASTOLIC BLOOD PRESSURE 80-89 MM HG: ICD-10-PCS | Mod: CPTII,S$GLB,, | Performed by: PSYCHIATRY & NEUROLOGY

## 2023-11-28 PROCEDURE — 99999 PR PBB SHADOW E&M-EST. PATIENT-LVL III: CPT | Mod: PBBFAC,,, | Performed by: PSYCHIATRY & NEUROLOGY

## 2023-11-28 PROCEDURE — 99999 PR PBB SHADOW E&M-EST. PATIENT-LVL III: ICD-10-PCS | Mod: PBBFAC,,, | Performed by: PSYCHIATRY & NEUROLOGY

## 2023-11-28 PROCEDURE — 36415 COLL VENOUS BLD VENIPUNCTURE: CPT | Performed by: PSYCHIATRY & NEUROLOGY

## 2023-11-28 PROCEDURE — 3077F SYST BP >= 140 MM HG: CPT | Mod: CPTII,S$GLB,, | Performed by: PSYCHIATRY & NEUROLOGY

## 2023-11-28 PROCEDURE — 3008F PR BODY MASS INDEX (BMI) DOCUMENTED: ICD-10-PCS | Mod: CPTII,S$GLB,, | Performed by: PSYCHIATRY & NEUROLOGY

## 2023-11-28 PROCEDURE — 86364 TISS TRNSGLTMNASE EA IG CLAS: CPT | Performed by: PSYCHIATRY & NEUROLOGY

## 2023-11-28 PROCEDURE — 1160F PR REVIEW ALL MEDS BY PRESCRIBER/CLIN PHARMACIST DOCUMENTED: ICD-10-PCS | Mod: CPTII,S$GLB,, | Performed by: PSYCHIATRY & NEUROLOGY

## 2023-11-28 PROCEDURE — 3077F PR MOST RECENT SYSTOLIC BLOOD PRESSURE >= 140 MM HG: ICD-10-PCS | Mod: CPTII,S$GLB,, | Performed by: PSYCHIATRY & NEUROLOGY

## 2023-11-28 PROCEDURE — 99215 PR OFFICE/OUTPT VISIT, EST, LEVL V, 40-54 MIN: ICD-10-PCS | Mod: S$GLB,,, | Performed by: PSYCHIATRY & NEUROLOGY

## 2023-11-28 PROCEDURE — 99215 OFFICE O/P EST HI 40 MIN: CPT | Mod: S$GLB,,, | Performed by: PSYCHIATRY & NEUROLOGY

## 2023-11-28 RX ORDER — LANOLIN ALCOHOL/MO/W.PET/CERES
400 CREAM (GRAM) TOPICAL 2 TIMES DAILY
COMMUNITY

## 2023-11-28 NOTE — PROGRESS NOTES
[facility] - NEUROLOGY 7TH FL [facility], [place]    Date: 11/28/23  Patient Name: Alison Magana   MRN: [id number]   Referring Provider: No ref. provider found    Thank you so much No ref. provider found for your patient referral to Neuromuscular team at [facility]. We take pride in our care coordination and look forward to your feedback and questions.    Assessment:   Alison Magana is a 43 y.o. female is a very pleasant patient with multitude of symptoms in the setting of possible dysautonomia, migraine variant headache, premenstrual syndrome for evaluation.  I feel she has some degree of fibromyalgia before COVID which resulted in worsening of her symptoms.  I will do tilt-table test for objective confirmation of postural orthostatic tachycardia syndrome.  I will complete her workup for dysautonomia.    I discussed about fall precautions and need for Physiotherapy. I addressed her complaints. I provided information about fall precautions and healthy lifestyle. I would wish her very best for improvement/recovery in her condition.    Future direction based on feedback:    Plan:     Problem List Items Addressed This Visit          Neuro    Dysautonomia - Primary    Relevant Orders    IMMUNOGLOBULINS (IGG, IGA, IGM) QUANTITATIVE (Completed)    AMYLASE (Completed)    LIPASE (Completed)    TISSUE TRANSGLUTAMINASE, IGA    VITAMIN B6    Tilt table    Migraine variant with headache    Intractable episodic tension-type headache       Psychiatric    Anxiety       Renal/    Pre-menstrual syndrome       Other    Dizziness and giddiness       Rahul Massey MD    This evaluation was completed in >75  Minutes over 50% of the time spent on education & counseling. This includes face to face time and non-face to face time preparing to see the patient (eg, review of tests), obtaining and/or reviewing separately obtained history, documenting clinical information in the electronic  February 20, 2023      Asmita WILKINSON Marthaross  [address]  [place] [address]        Dear ,    We are writing to inform you of your test results.    {results letter list:580482}    Resulted Orders   Hemoglobin A1c   Result Value Ref Range    Hemoglobin A1C 7.6 (H) 0.0 - 5.6 %      Comment:      Normal <5.7%   Prediabetes 5.7-6.4%    Diabetes 6.5% or higher     Note: Adopted from ADA consensus guidelines.       If you have any questions or concerns, please call the clinic at the number listed above.       Sincerely,      Addie Lerner, GAURANG CNP           or other health record, independently interpreting results and communicating results to the patient/family/caregiver, or care coordinator.    Patient note was created using MModal Dictation.  Any errors in syntax or even information may not have been identified and edited on initial review prior to signing this note.    Details provided by:    Patient  Family-  (Sarthak)    Reason for visit:  Concern for dysautonomia.      HISTORY OF PRESENT ILLNESS   Ms. Alison Magana is a 43 y.o. female presenting for evaluation of dysautonomia.  She was diagnosed with COVID in November of 2022 and she was noted to have tachycardia with fluctuation of blood pressure.  Since March of 2023, she feels that she is in a fight and flight mode and waking up with anxiety and shortness of breath.  She feels even doing small chores including laundry and brushing teeth can make her tired.  She feels that she has been working out more than usual since Jan 2023 with progressive fatigue.    She noticed dizziness with black spots in front of her eyes even while standing in line.  She had breast implants 7 years ago which was removed in August of 2023.  She was diagnosed with fibromyalgia 4 years ago but her ALTON was negative.  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    They have 2 kids    Autonomic specific Questionaire:                                                             General-   Fatigue [x] Yes, [] No , for 4 years and there was diagnosis of fibromyalgia  Weight loss [x] Yes, [] No. Since 06/23 she lost 20 Lb in 2 months. She took breast implants out 08/2023    Neurologic-   dizziness [x] Yes, [] No, Not passed out. More on standing and sometimes during laying in bed. Not motion related. Last for couple of seconds 15-20 sec. Glowing dots in front of eyes. She had it years ago with high BP.  Presyncope [x] Yes, [] No,   Tremulousness [x] Yes, [] No, intermittent in nature since the surgery. It has  improved. It worsened around her periods.  Sleep issue [x] Yes, [] No, she uses zyrtec for sleep. No h/o snoring. Sometimes she is not able to sleep and feel like catching on breath with rapid heart rate.  Headache [x] Yes, [] No, She has one right now. Sometimes has salty metallic taste in mouth with headache. She feels like a burst/popping like spread of pain at vertex area. Not exactly Sensitive to light and sound but she calls it over stimulated like light and sound can brings on the headache. It can last 2-3 hours when it come. No h/o migraines. She use to have sinus stuff.  Neuropathic pain [x] Yes, [] No. It has been ongoing for one year. Feet pain for 3 years. Tingling since  and numbness in hands, feet and face.    Cardiologic-   Palpitation [x] Yes, [] No, variable and especially worse with activity  Shortness Of Breath [x] Yes, [] No,   Chest pain [x] Yes, [] No. Especially on left side and it can radiate to arm.    Autonomic-   Cold intolerance [x] Yes, [] No, Since 3-2023 and she feels chills and goose bumps  Exacerbation by meals [x] Yes, [] No, gluten helped her symptoms. She stopped coffee. Her heart rate increases with meals.  Periods (Menses) related worsening of baseline symptoms [x] Yes, [] No, increased heart rate and body tenses up it starts before her cycle and last after her cycle is over.  Increased sweating [x] Yes, [] No, It has always been there  Absent sweating [] Yes, [x] No.   Light sensitivity [x] Yes, [] No. With headache    Gastro-   Abdominal pain [x] Yes, [] No,  in epigastric region and it comes up with greasy food.  Nausea [x] Yes, [] No, without headache  Bloating [x] Yes, [] No, Improved after gluten free  Diarrhea [x] Yes, [] No, It is new to her.  Constipation [] Yes, [x] No,   Vomiting [x] Yes, [] No. Without headache    Genitourinary-   bladder symptoms [x] Yes, [] No, feel bladder spasming.  Change in libido [] Yes, [x] No,   Abnormal periods [x] Yes, [] No, she  has PCOS. This year has been regular despite it can be painful and heavy  Erectile dysfunction [] Yes, [] No.    Skin-   rash [x] Yes, [] No. She is recently diagnosed with rosacea    Hematologic/lymphatic: [] easy bruising/bleeding, [] night sweats   Allergy/Immunologic: [] fevers, [x] chills   Musculoskeletal: [x] muscle pain, [x] joint pain-- legs especially calves  Psychiatric: [] thoughts of harming self/others, [] depression     Pertinent work up based on chart review for current condition:  CBC with MCV 88   Comprehensive metabolic panel with mildly high AST  Urine normal except ketones are high  Vitamin B12 level 861   SSA/SSB negative   ESR 13   C-reactive protein 1.0   ALTON negative   Aldolase 3.9   CK 32   Aldosterone level 11 normal   Tissue transglutaminase antibody negative   Cortisol level 11.5   Plasma metanephrine level normal  Coagulation profile normal except for antithrombin 3 which is mildly higher than normal.    Iron saturation 18 person slightly lower than normal   Drug screen negative     EKG with heart rate of 112 beats per minute    MRI and MRV brain without contrast 09/21/2023.    No acute intracranial abnormality    Addendum:   Echocardiogram normal with mild mitral and tricuspid valve regurgitation.    LH level 18.9   FSH level 10   Estradiol 65.1    Review of Systems:  12 system review of systems is negative except for the symptoms mentioned in HPI.       Past Medical History Social History   Past Medical History:   Diagnosis Date    Clotting disorder     History of gestational diabetes     Hypertension     PCOS (polycystic ovarian syndrome)     Prediabetes       Social History     Socioeconomic History    Marital status:    Tobacco Use    Smoking status: Never    Smokeless tobacco: Never   Substance and Sexual Activity    Drug use: Never    Sexual activity: Yes     Partners: Male        Family History Past Surgical History   No family history on file.  Past Surgical History:  "  Procedure Laterality Date    ADENOIDECTOMY      breast implants Bilateral 08/11/2023    removal    kidney stone removal      TONSILLECTOMY          Allergies    Review of patient's allergies indicates:   Allergen Reactions    Sulfa (sulfonamide antibiotics) Hives, Itching, Palpitations and Shortness Of Breath    Pseudoephedrine             Medications     Current Outpatient Medications   Medication Sig Dispense Refill    ACCU-CHEK TRELL PLUS TEST STRP Strp SMARTSIG:Via Meter Daily      ALPRAZolam (XANAX) 0.25 MG tablet Take 0.25 mg by mouth.      apixaban (ELIQUIS) 5 mg Tab Take 5 mg by mouth 2 (two) times daily.      blood-glucose meter Misc Use as directed      propranoloL (INDERAL) 10 MG tablet Take 5 mg by mouth once daily. Patient taking 5mg once a day       No current facility-administered medications for this visit.         LABS   Last CBC Results:   Lab Results   Component Value Date    WBC 7.52 10/05/2023    HGB 12.8 10/05/2023    HCT 39.3 10/05/2023     10/05/2023       Last CMP Results  Lab Results   Component Value Date     10/05/2023    K 3.8 10/05/2023     10/05/2023    CO2 25 10/05/2023    BUN 7 10/05/2023    CREATININE 0.69 10/05/2023    CALCIUM 9.4 10/05/2023    ALBUMIN 4.6 10/05/2023    AST 34 10/05/2023    ALT 27 10/05/2023       Last Lipids  Lab Results   Component Value Date    CHOL 207 (H) 09/13/2010    TRIG 75 09/13/2010    HDL 54 09/13/2010    LDLCALC 138.0 (H) 09/13/2010       Last A1C  No results found for: "HGBA1C"    Last TSH  Lab Results   Component Value Date    TSH 1.39 07/12/2011         PHYSICAL EXAMINATION     Vitals:    11/28/23 1404   BP: (!) 141/89   Pulse: 85   Weight: 54.7 kg (120 lb 9.5 oz)   Height: 4' 11" (1.499 m)     Wt Readings from Last 3 Encounters:   11/01/23 0900 55.3 kg (121 lb 12.9 oz)   10/05/23 0937 55.2 kg (121 lb 11.1 oz)   09/08/23 0812 55.3 kg (122 lb 0.4 oz)     Body mass index is 24.36 kg/m².     General: No acute distress, calm & " cooperative  Head: Atraumatic, normocephalic  HEENT: PERRLA, EOMI, Oral mucosa moist   Neck: Supple, trachea midline  Cardiovascular: Regular rate and rhythm.  Pulmonary: CTAB, no increased work of breathing, no rhonchi or wheezing  Extremities: Warm, well-perfused, no significant edema  Psychiatric: Normal mood & affect; behavior normal & appropriate  Skin: No jaundice, rashes    GENERAL/CONSTITUTIONAL:    -Well appearing; well nourished  - Sternal tenderness  -no scalp, occipital, elbow or knee tenderness    HIGHER INTEGRATIVE FUNCTIONS:   -Attention & concentration: Normal   -Orientation: Oriented to person, place & time  -Memory: Normal  -Language: Normal   -Fund of Knowledge: Normal     CRANIAL NERVES:   -CN 2: Visual fields full  -CN 2,3: PERRL  -CN 3,4,6: EOMI  -CN 5: Facial sensation intact bilaterally  -CN 7: Facial strength/movement intact bilaterally  -CN 8: Hearing normal bilaterally  -CN 9,10: Palate elevates symmetrically  -CN 11: Normal shoulder shrug and head turn  -CN 12: Tongue protrudes midline     MOTOR:   -Tone: normal in upper and lower extremities  -UE/LE motor: 5/5 throughout, no pronator drift     SENSATION:   -Intact bilaterally to Vibration  -Pinprick is impaired in left ankle and up to lower 1/3 of shin on the right side.      REFLEXES:   -Deep tendon reflexes are brisk with spreading in pectoralis muscles.    -Flexor plantar reflex bilaterally    COORDINATION:   -FNF normal bilaterally    GAIT:   -Normal casual  gait. Able to stand on heels and toes without difficulty.    Scheduled Follow-up :  Future Appointments   Date Time Provider Department Center   11/28/2023  2:00 PM Rahul Massey MD [facility] NEURO [place] korin   1/17/2024  2:30 PM Candace Fajardo NP [facility] HEADACH [place]   4/17/2024  9:30 AM Christian Haywood MD [facility] VINITA [place]       After Visit Medication List :     Medication List            Accurate as of November 28, 2023 11:32 AM. If you have any questions, ask your  nurse or doctor.                CONTINUE taking these medications      ACCU-CHEK TRELL PLUS TEST STRP Strp  Generic drug: blood sugar diagnostic     ALPRAZolam 0.25 MG tablet  Commonly known as: XANAX     blood-glucose meter Misc     ELIQUIS 5 mg Tab  Generic drug: apixaban     propranoloL 10 MG tablet  Commonly known as: INDERAL              Signing Physician:          Rahul Massey MD  , [employer] [facility] [employer]  Neurology Consultant. [facility].   [address] [place] [address].

## 2023-11-28 NOTE — PATIENT INSTRUCTIONS
Walk and exercise  FSH, LH results. Echo. To be sent over to office.  In case of any question, plz contact through [facility].

## 2023-12-01 LAB — TTG IGA SER-ACNC: 0.7 U/ML

## 2023-12-04 LAB — PYRIDOXAL SERPL-MCNC: 15 UG/L (ref 5–50)

## 2024-10-09 DIAGNOSIS — I10 ESSENTIAL HYPERTENSION: ICD-10-CM

## 2025-04-11 ENCOUNTER — OFFICE VISIT (OUTPATIENT)
Dept: NEUROLOGY | Facility: CLINIC | Age: 45
End: 2025-04-11
Payer: COMMERCIAL

## 2025-04-11 DIAGNOSIS — G44.211 INTRACTABLE EPISODIC TENSION-TYPE HEADACHE: Primary | ICD-10-CM

## 2025-04-11 DIAGNOSIS — M79.18 CERVICAL MYOFASCIAL PAIN SYNDROME: ICD-10-CM

## 2025-04-11 DIAGNOSIS — G90.A POTS (POSTURAL ORTHOSTATIC TACHYCARDIA SYNDROME): ICD-10-CM

## 2025-04-11 DIAGNOSIS — G43.019 INTRACTABLE MIGRAINE WITHOUT AURA AND WITHOUT STATUS MIGRAINOSUS: ICD-10-CM

## 2025-04-11 RX ORDER — RIZATRIPTAN BENZOATE 10 MG/1
TABLET ORAL
Qty: 9 TABLET | Refills: 11 | Status: SHIPPED | OUTPATIENT
Start: 2025-04-11

## 2025-04-11 RX ORDER — LISINOPRIL 5 MG/1
TABLET ORAL
COMMUNITY

## 2025-04-11 RX ORDER — TIZANIDINE 4 MG/1
TABLET ORAL
Qty: 30 TABLET | Refills: 11 | Status: SHIPPED | OUTPATIENT
Start: 2025-04-11

## 2025-04-11 NOTE — PATIENT INSTRUCTIONS
Please call our clinic at [phone] or send a message on the [facility] portal if there are any changes to the plan described below, for example,if you are not contacted for the requested tests, referral(s) within one week, if you are unable to receive the medications prescribed, or if you feel you need to change the treatment course for any reason.     TESTING:  -- none     REFERRALS:  -- physical therapy      PREVENTION (use daily regardless of headache):  -- continue magnesium in ONE of the following preparations -               1. Magnesium oxide 800mg daily (the most common over the counter kind, may causes loose stools)              2. Magnesium citrate 400-500mg daily (harder to find, but more neutral on the bowels)              3. Magnesium glycinate 400mg daily (hardest to find, look online, but most bowel-neutral, best absorbed)     AS-NEEDED TREATMENT (use total no more than 10 days per month unless otherwise stated):  -- ok to take tylenol   -- start rizatriptan with next headache escalation. You can repeat two hours later if needed   START tizanidine at night. This is a muscle relaxer and it will also make you potentially sleepy. Start with half a tablet to see how you respond but can take up to a whole tablet if needed

## 2025-04-11 NOTE — PROGRESS NOTES
Date of service: 4/11/2025  Referring provider: No ref. provider found    Subjective:      Chief complaint: Headache       Patient ID: Alison Magana is a 44 y.o. female with anxiety, POTS who presents for follow up of headache     History of Present Illness    INTERVAL HISTORY 4/11/25  The patient location is: home  The chief complaint leading to consultation is: follow up  Visit type: audiovisual  22 minutes of total time spent on the encounter, which includes face to face time and non-face to face time preparing to see the patient (eg, review of tests), Obtaining and/or reviewing separately obtained history, Documenting clinical information in the electronic or other health record, Independently interpreting results (not separately reported) and communicating results to the patient/family/caregiver, or Care coordination (not separately reported).   Each patient to whom he or she provides medical services by telemedicine is:  (1) informed of the relationship between the physician and patient and the respective role of any other health care provider with respect to management of the patient; and (2) notified that he or she may decline to receive medical services by telemedicine and may withdraw from such care at any time.    Notes:     Last visit was about 17 months ago and at that time she was doing better.    Today she reports she is doing better. She has been diagnosed with POTS. She has made some lifestyle changes which have helped. She reports current migraine ongoing for a week. Typically she has rare migraines. Her daughter has recently been diagnosed with migraines. Otherwise information below is reviewed and verified with no changes made    INTERVAL HISTORY 11/1/23    Last visit was about six weeks ago and at that time I ordered imaging and recommended referral for dysautonomia. Referred to sleep study and ophthalmology. MRI and MRV normal.    Today she reports she is better. Seeing spots and more  "floaters, tension in temples. Headache feels like tightness/squeezing in temple area and is brief. Current pain 1 with range 0-8. She has 3-4 headache days per week. She takes tylenol 4-5 days per week. She saw outside ophthalmology who noted dry eyes but otherwise normal. She is scheduled with sleep specialist next week. Otherwise information below is reviewed and verified with no changes made     ORIGINAL HEADACHE HISTORY - 9/8/23  Age at onset and course over time: about a month ago she had breast explant surgery and headaches started after that.   She denies migraine history. She had a history of tension type headaches. No family history of headaches.   Her PCP suspects POTS but cardiology does not.   She has dizziness upon standing with tachycardia. She has a feeling of "about to pass out". She is getting out of break easier when standing.   Headaches feels like "popping sensation" or "zap". CT non-contrast done at outside facility which was reported as normal.   Also she has a new blood clot in arm, discovered earlier this week. She is not on Eliquis. She had Holter monitor that showed sinus tachycardia and some PVCs.   She has new blurred vision.  Headaches are brief, but will repeat same day. Headache location varies between episodes. Right eye can be red and swollen during episodes independent of headache location.   She has had episodes of facial numbness/tingling associated with headaches.     Of note, she had Covid in December 2022 and reports she had BP and HR issues more than respiratory symptoms. She recalls in April 2023 symptoms of dizziness began.    Location: temples, top of head, back of neck   Quality:  [x] pressure [x] tight [x] throbbing [x] sharp [x] stabbing   Severity: current 2 with range 1-6  Duration: seconds, minutes  Frequency: daily  Headaches awaken at night?:  yes  Worst time of day: upon waking, morning, evening   Associated with: [x] photophobia [x]  phonophobia [x] osmophobia [x] " blurred vision  [] double vision [x] loss of appetite [x] nausea [] vomiting [x] dizziness [x] vertigo  [] tinnitus [x] irritability [x] sinus pressure [] problems with concentration   [x] neck tightness   Alleviated by:  [x] sleep [] darkness [x] massage [] heat [] ice [] medication  Exacerbated by:  [x] fatigue [] light [] noise [] smells [] coughing [] sneezing  [x] bending over [] ovulation [x] menses [x] alcohol [] change in weather [x]  stress  Ipsilateral autonomic: [] nasal congestion [] lacrimation [] ptosis [] injection [] edema [] foreign body sensation [] ear fullness   ICP:  [] transient visual obscurations  [x] tinnitus low pitched, high pitched, pulsatile - rare prior to a month ago. Pulsatile started within the past month   [] positional headache  [x] non-positional   Sleep habits: trouble staying asleep, sleep apnea - has not had sleep study but suspects it based off sleep patterns   Caffeine intake: none  Gyn status (if female): having periods -  recent negative pregnancy test   HIT 6: did not complete     Current acute treatment:  Xanax    Current prevention:   Propranolol   Lisinopril   (Eliquis)  Magnesium    Previously tried/failed acute treatment:  Tylenol  Tizanidine    Previously tried/failed preventative treatment:  Lisinopril  Metoprolol  Fluoxetine    Review of patient's allergies indicates:   Allergen Reactions    Sulfa (sulfonamide antibiotics) Hives, Itching, Palpitations and Shortness Of Breath    Pseudoephedrine      Current Outpatient Medications   Medication Sig Dispense Refill    ACCU-CHEK TRELL PLUS TEST STRP Strp SMARTSIG:Via Meter Daily      ALPRAZolam (XANAX) 0.25 MG tablet Take 0.25 mg by mouth.      apixaban (ELIQUIS) 5 mg Tab Take 5 mg by mouth 2 (two) times daily.      blood-glucose meter Misc Use as directed      lisinopriL (PRINIVIL,ZESTRIL) 5 MG tablet 90      magnesium oxide (MAG-OX) 400 mg (241.3 mg magnesium) tablet Take 400 mg by mouth 2 (two) times daily.       propranoloL (INDERAL) 10 MG tablet Take 5 mg by mouth 2 (two) times daily. Patient taking 5mg once a day      rizatriptan (MAXALT) 10 MG tablet 1 tab PO PRN migraine. May repeat every 2 hours for max 3 tabs in 24 hours. Use no more than 10 days per month. 9 tablet 11    tiZANidine (ZANAFLEX) 4 MG tablet Half or full tablet by mouth at night as needed for muscle spasm 30 tablet 11     No current facility-administered medications for this visit.       Past Medical History  Past Medical History:   Diagnosis Date    Clotting disorder     History of gestational diabetes     Hypertension     PCOS (polycystic ovarian syndrome)     Prediabetes        Past Surgical History  Past Surgical History:   Procedure Laterality Date    ADENOIDECTOMY      breast implants Bilateral 08/11/2023    removal    kidney stone removal      TONSILLECTOMY         Family History  No family history on file.    Social History  Social History     Socioeconomic History    Marital status:    Tobacco Use    Smoking status: Never    Smokeless tobacco: Never   Substance and Sexual Activity    Drug use: Never    Sexual activity: Yes     Partners: Male     Social Drivers of Health     Financial Resource Strain: Low Risk  (4/11/2025)    Overall Financial Resource Strain (CARDIA)     Difficulty of Paying Living Expenses: Not very hard   Food Insecurity: No Food Insecurity (4/11/2025)    Hunger Vital Sign     Worried About Running Out of Food in the Last Year: Never true     Ran Out of Food in the Last Year: Never true   Transportation Needs: No Transportation Needs (4/11/2025)    PRAPARE - Transportation     Lack of Transportation (Medical): No     Lack of Transportation (Non-Medical): No   Physical Activity: Insufficiently Active (4/11/2025)    Exercise Vital Sign     Days of Exercise per Week: 3 days     Minutes of Exercise per Session: 30 min   Stress: Stress Concern Present (4/11/2025)    [employer] - Occupational  Stress Questionnaire     Feeling of Stress : To some extent   Housing Stability: Low Risk  (4/11/2025)    Housing Stability Vital Sign     Unable to Pay for Housing in the Last Year: No     Number of Times Moved in the Last Year: 0     Homeless in the Last Year: No      Objective:        There were no vitals filed for this visit.      There is no height or weight on file to calculate BMI.    4/11/25  Constitutional:   She appears well-developed and well-nourished. She is well groomed     Neurological Exam:  General: well-developed, well-nourished, no distress  Mental status: Awake and alert  Speech language: No dysarthria or aphasia on conversation  Cranial nerves: Face symmetric    Data Review:     I have personally reviewed the referring provider's notes, labs, & imaging made available to me today.      RADIOLOGY STUDIES:  I have personally reviewed the pertinent images performed.       No results found for this or any previous visit.    Lab Results   Component Value Date     03/07/2025     03/07/2025    K 4.1 03/07/2025    K 4.1 03/07/2025    MG 2.1 03/07/2025     03/07/2025     03/07/2025    CO2 23 03/07/2025    CO2 23 03/07/2025    BUN 14 03/07/2025    BUN 14 03/07/2025    CREATININE 0.83 03/07/2025    CREATININE 0.83 03/07/2025    GLU 95 03/07/2025    GLU 95 03/07/2025    HGBA1C 5.2 03/07/2025    AST 21 03/07/2025    AST 21 03/07/2025    ALT 18 03/07/2025    ALT 18 03/07/2025    ALBUMIN 4.5 03/07/2025    ALBUMIN 4.5 03/07/2025    PROT 7.3 03/07/2025    PROT 7.3 03/07/2025    BILITOT 0.3 03/07/2025    BILITOT 0.3 03/07/2025    CHOL 212 (H) 02/09/2024    CHOL 207 (H) 09/13/2010    HDL 46 02/09/2024    HDL 54 09/13/2010    LDLCALC 152 (H) 02/09/2024    LDLCALC 138.0 (H) 09/13/2010    TRIG 72 02/09/2024    TRIG 75 09/13/2010       Lab Results   Component Value Date    WBC 6.02 03/07/2025    WBC 6.02 03/07/2025    HGB 12.4 03/07/2025    HGB 12.4 03/07/2025    HCT 37.7 03/07/2025    HCT 37.7  03/07/2025    MCV 87 03/07/2025    MCV 87 03/07/2025     03/07/2025     03/07/2025       Lab Results   Component Value Date    TSH 1.955 03/07/2025    TSH 1.955 03/07/2025           Assessment & Plan:       Problem List Items Addressed This Visit          Neuro    Intractable episodic tension-type headache - Primary     Other Visit Diagnoses         POTS (postural orthostatic tachycardia syndrome)        Diagnosed since last visit and followed by Dr. Simmons      Cervical myofascial pain syndrome        Relevant Medications    tiZANidine (ZANAFLEX) 4 MG tablet    Other Relevant Orders    Ambulatory referral/consult to Physical/Occupational Therapy      Intractable migraine without aura and without status migrainosus        Rare attacks. Add triptan    Relevant Medications    rizatriptan (MAXALT) 10 MG tablet                    Please call our clinic at [phone] or send a message on the [employer] portal if there are any changes to the plan described below, for example,if you are not contacted for the requested tests, referral(s) within one week, if you are unable to receive the medications prescribed, or if you feel you need to change the treatment course for any reason.     TESTING:  -- none     REFERRALS:  -- physical therapy      PREVENTION (use daily regardless of headache):  -- continue magnesium in ONE of the following preparations -               1. Magnesium oxide 800mg daily (the most common over the counter kind, may causes loose stools)              2. Magnesium citrate 400-500mg daily (harder to find, but more neutral on the bowels)              3. Magnesium glycinate 400mg daily (hardest to find, look online, but most bowel-neutral, best absorbed)     AS-NEEDED TREATMENT (use total no more than 10 days per month unless otherwise stated):  -- ok to take tylenol   -- start rizatriptan with next headache escalation. You can repeat two hours later if needed   START tizanidine at night. This is  a muscle relaxer and it will also make you potentially sleepy. Start with half a tablet to see how you respond but can take up to a whole tablet if needed    Follow up in about 3 months (around 7/11/2025).    Candace Fajardo, NP

## 2025-06-23 ENCOUNTER — PATIENT MESSAGE (OUTPATIENT)
Dept: NEUROLOGY | Facility: CLINIC | Age: 45
End: 2025-06-23
Payer: COMMERCIAL

## 2025-08-19 ENCOUNTER — PATIENT MESSAGE (OUTPATIENT)
Dept: ADMINISTRATIVE | Facility: HOSPITAL | Age: 45
End: 2025-08-19
Payer: COMMERCIAL